# Patient Record
Sex: MALE | Race: OTHER | HISPANIC OR LATINO
[De-identification: names, ages, dates, MRNs, and addresses within clinical notes are randomized per-mention and may not be internally consistent; named-entity substitution may affect disease eponyms.]

---

## 2020-12-09 PROBLEM — Z00.00 ENCOUNTER FOR PREVENTIVE HEALTH EXAMINATION: Status: ACTIVE | Noted: 2020-12-09

## 2020-12-11 ENCOUNTER — APPOINTMENT (OUTPATIENT)
Dept: UROLOGY | Facility: CLINIC | Age: 55
End: 2020-12-11

## 2021-07-22 ENCOUNTER — APPOINTMENT (OUTPATIENT)
Dept: UROLOGY | Facility: CLINIC | Age: 56
End: 2021-07-22
Payer: COMMERCIAL

## 2021-07-22 VITALS
HEART RATE: 65 BPM | DIASTOLIC BLOOD PRESSURE: 101 MMHG | SYSTOLIC BLOOD PRESSURE: 167 MMHG | WEIGHT: 196 LBS | HEIGHT: 69 IN | BODY MASS INDEX: 29.03 KG/M2

## 2021-07-22 DIAGNOSIS — Z86.79 PERSONAL HISTORY OF OTHER DISEASES OF THE CIRCULATORY SYSTEM: ICD-10-CM

## 2021-07-22 DIAGNOSIS — K40.20 BILATERAL INGUINAL HERNIA, W/OUT OBSTRUCTION OR GANGRENE, NOT SPECIFIED AS RECURRENT: ICD-10-CM

## 2021-07-22 DIAGNOSIS — Z82.49 FAMILY HISTORY OF ISCHEMIC HEART DISEASE AND OTHER DISEASES OF THE CIRCULATORY SYSTEM: ICD-10-CM

## 2021-07-22 DIAGNOSIS — F17.290 NICOTINE DEPENDENCE, OTHER TOBACCO PRODUCT, UNCOMPLICATED: ICD-10-CM

## 2021-07-22 PROCEDURE — 99204 OFFICE O/P NEW MOD 45 MIN: CPT

## 2021-07-22 RX ORDER — OLMESARTAN MEDOXOMIL / AMLODIPINE BESYLATE / HYDROCHLOROTHIAZIDE 40; 10; 12.5 MG/1; MG/1; MG/1
40-10-12.5 TABLET, FILM COATED ORAL
Refills: 0 | Status: ACTIVE | COMMUNITY

## 2021-07-22 NOTE — LETTER BODY
[Consult Letter:] : I had the pleasure of evaluating your patient, [unfilled]. [Please see my note below.] : Please see my note below. [Consult Closing:] : Thank you very much for allowing me to participate in the care of this patient.  If you have any questions, please do not hesitate to contact me. [Sincerely,] : Sincerely, [Dear  ___] : Dear  [unfilled], [FreeTextEntry2] : Jonatan Ramirez MD\par [address]\par [place] [address]\par

## 2021-07-22 NOTE — PHYSICAL EXAM
[General Appearance - Well Developed] : well developed [General Appearance - Well Nourished] : well nourished [Normal Appearance] : normal appearance [Well Groomed] : well groomed [General Appearance - In No Acute Distress] : no acute distress [Heart Rate And Rhythm] : Heart rate and rhythm were normal [Edema] : no peripheral edema [Respiration, Rhythm And Depth] : normal respiratory rhythm and effort [Exaggerated Use Of Accessory Muscles For Inspiration] : no accessory muscle use [Auscultation Breath Sounds / Voice Sounds] : lungs were clear to auscultation bilaterally [Abdomen Soft] : soft [Abdomen Tenderness] : non-tender [Costovertebral Angle Tenderness] : no ~M costovertebral angle tenderness [Urethral Meatus] : meatus normal [Penis Abnormality] : normal uncircumcised penis [Testes Tenderness] : no tenderness of the testes [Normal Station and Gait] : the gait and station were normal for the patient's age [] : no rash [Oriented To Time, Place, And Person] : oriented to person, place, and time [Affect] : the affect was normal [Mood] : the mood was normal [Inguinal Lymph Nodes Enlarged Bilaterally] : inguinal [FreeTextEntry1] : Very anxious and embarrassed about the issue especially having to talk about it

## 2021-07-22 NOTE — ASSESSMENT
[FreeTextEntry1] : He appears to have developed erectile dysfunction presumed secondary to vascular disease and when his blood pressure is controlled to the point that he is not going to have a heart attack or stroke there is not enough blood flow to the penis to maintain the erection.\par \par I am going to want blood test to make sure there is nothing hormonal.  I am going to need [place] criteria classification.  He will come back in he tells me that PDE 5 inhibitors do not help we will discuss SSRIs.  I will see if he can worry less about rapid ejaculation if his erections will suffice.  If not we will discuss second line therapy such as injections or implants

## 2021-07-22 NOTE — LETTER HEADER
[FreeTextEntry3] : Frances Mathis M.D.\par Director of Urology\par [facility]\par [address] [place] [address], [address]\[place] [address]

## 2021-07-22 NOTE — HISTORY OF PRESENT ILLNESS
[FreeTextEntry1] : Bebeto is a 55-year-old  male  for a while and in the past he did have a problem with rapid ejaculation.  Using various desensitizing techniques having sex twice the same night they could work alerted his wife was not always ecstatic she was happy enough.  For the last 3 years his erections even when he can hold back on orgasm soften and fall out during sex.  His wife is not very happy and tells him he is getting so embarrassed and distraught that he is avoiding sex and he wants to know what we can do.\par \par He thinks it relates to when he was started on blood pressure medication I explained to him that the blood pressure medication is keeping him alive and will fix the penis 1 way or the other.  When he does get to orgasm his penis is very rigid even without 85 inhibitors and he is tried "over-the-[place]" [place] not know what he is getting and that it really helped that much.\par \par Comorbid conditions include hypertension and his medications are over-the-counter pain medications and vitamins.  He has seen a therapist for addiction and family issues he is now only using cigars and will have no drugs alcohol etc.\par \par He works as a  without exposure to toxic chemicals.\par \par He denies any other urologic issues voiding well without blood positive burning.

## 2021-08-30 ENCOUNTER — APPOINTMENT (OUTPATIENT)
Dept: UROLOGY | Facility: CLINIC | Age: 56
End: 2021-08-30
Payer: COMMERCIAL

## 2021-08-30 VITALS
HEIGHT: 69 IN | DIASTOLIC BLOOD PRESSURE: 97 MMHG | BODY MASS INDEX: 28.88 KG/M2 | SYSTOLIC BLOOD PRESSURE: 145 MMHG | HEART RATE: 87 BPM | WEIGHT: 195 LBS

## 2021-08-30 PROCEDURE — 99213 OFFICE O/P EST LOW 20 MIN: CPT

## 2021-08-30 NOTE — LETTER BODY
[Dear  ___] : Dear  [unfilled], [Consult Letter:] : I had the pleasure of evaluating your patient, [unfilled]. [Please see my note below.] : Please see my note below. [Consult Closing:] : Thank you very much for allowing me to participate in the care of this patient.  If you have any questions, please do not hesitate to contact me. [Sincerely,] : Sincerely, [FreeTextEntry2] : Jonatan Ramirez MD\par [address]\par [place] [address]\par

## 2021-08-30 NOTE — ASSESSMENT
[FreeTextEntry1] : He will obtain [place] criteria and bring it in tomorrow for review.  He understands blood work will take about 1 to 2 weeks to result.  If he brings in [place] criteria tomorrow we will discuss PDE 5 inhibitors

## 2021-08-30 NOTE — LETTER HEADER
[FreeTextEntry3] : Frances Mathis M.D.\par Director of Urology\par [facility]\par [address] [place] [address], [address]\[place] [address]

## 2021-08-30 NOTE — HISTORY OF PRESENT ILLNESS
[FreeTextEntry1] : Bebeto is a 55-year-old male who presented for evaluation of erectile dysfunction.\par \par He obtained hormone blood work however, this morning, and he understands that we will be ready for review for about 1 to 2 weeks.\par \par Additionally he obtain [place] criteria however, did not bring it in for review\par \par Reports having tried both Cialis and Viagra in the past, although he does not know the dose, without much efficacy.  If he could he would not be averse to retrying the Viagra knowing that if his hormones are indeed low its efficacy is compromised\par \par  [Erectile Dysfunction] : Erectile Dysfunction

## 2021-08-31 ENCOUNTER — APPOINTMENT (OUTPATIENT)
Dept: UROLOGY | Facility: CLINIC | Age: 56
End: 2021-08-31
Payer: COMMERCIAL

## 2021-08-31 VITALS — HEIGHT: 69 IN | BODY MASS INDEX: 28.88 KG/M2 | WEIGHT: 195 LBS

## 2021-08-31 PROCEDURE — 99214 OFFICE O/P EST MOD 30 MIN: CPT

## 2021-08-31 NOTE — ASSESSMENT
[FreeTextEntry1] : His [place] criteria classification equals "I"\par His hormones are pending and he has failed PDE 5 inhibitors.  If his hormones are normal then we need to try injections.  If his hormones are low we need to do replacement and then rechallenge with PDE 5 inhibitors.  He will contact us after the hormones are available if he wants to we will have him come in for an office visit if not we will do it as telemedicine.  In the interim we discussed a Doppler study and a self injection program so once we have the hormones we can go straight to further testing if normal or if we correct his hormones PDE 5 inhibitors still fail

## 2021-08-31 NOTE — LETTER HEADER
[FreeTextEntry3] : Frances Mathis M.D.\par Director of Urology\par [facility]\par [address] [place] [address], [address]\[place] [address]

## 2021-08-31 NOTE — LETTER BODY
[Dear  ___] : Dear  [unfilled], [Consult Letter:] : I had the pleasure of evaluating your patient, [unfilled]. [Please see my note below.] : Please see my note below. [Consult Closing:] : Thank you very much for allowing me to participate in the care of this patient.  If you have any questions, please do not hesitate to contact me. [Sincerely,] : Sincerely, [FreeTextEntry2] : Jonatan Ramirez MD\par [address]\par [place] [address]\par

## 2021-08-31 NOTE — HISTORY OF PRESENT ILLNESS
[FreeTextEntry1] : hormones drawn yesterday still pending\par Alfreda brought in but said testing ordered

## 2021-09-09 ENCOUNTER — APPOINTMENT (OUTPATIENT)
Dept: UROLOGY | Facility: CLINIC | Age: 56
End: 2021-09-09
Payer: COMMERCIAL

## 2021-09-09 DIAGNOSIS — N52.9 MALE ERECTILE DYSFUNCTION, UNSPECIFIED: ICD-10-CM

## 2021-09-09 DIAGNOSIS — F52.4 PREMATURE EJACULATION: ICD-10-CM

## 2021-09-09 PROCEDURE — 99214 OFFICE O/P EST MOD 30 MIN: CPT | Mod: 95

## 2021-09-09 NOTE — LETTER BODY
[Dear  ___] : Dear  [unfilled], [Consult Letter:] : I had the pleasure of evaluating your patient, [unfilled]. [Please see my note below.] : Please see my note below. [Consult Closing:] : Thank you very much for allowing me to participate in the care of this patient.  If you have any questions, please do not hesitate to contact me. [Sincerely,] : Sincerely, [FreeTextEntry2] : Jonatan Ramirez MD\par [address]\par [place] [address]\par

## 2021-09-09 NOTE — HISTORY OF PRESENT ILLNESS
[Home] : at home, [unfilled] , at the time of the visit. [Medical Office: ([facility])___] : at the medical office located in  [FreeTextEntry3] : he has received, reviewed and agreed to the telemedicine consent  [FreeTextEntry1] : Bebeto is a 55-year-old male born September 23, 1965.  He was seen on August 31, 2021.  His blood test had been drawn but just yesterday his [place] criteria was brought in but it said he still in the middle of testing and cannot yet be lateralized\par \par I have called his PCP Dr. Jonatan Calvillo who looked through the chart and said that he was depressed and criteria class I but we still needed the blood tests\par \par We elected to meet but today by TeleMed if the hormones are normal we will schedule a Doppler referral as he has failed PDE 5 inhibitors.  If the hormones were low he would have to verify that and if verified normalize him and then rechallenge with PDE 5 inhibitors.  If it still did not work we would then go to hormones.\par \par The bloods from August 30, 2021 are available please see below\par \par \par Contact number: \par Email: [email or web address] \par \par Patient's blood results are in the chart.   [Erectile Dysfunction] : Erectile Dysfunction

## 2021-09-09 NOTE — LETTER HEADER
[FreeTextEntry3] : Frances Mathis M.D.\par Director of Urology\par [facility]\par [address] [place] [address], [address]\[place] [address]

## 2021-09-09 NOTE — ASSESSMENT
[FreeTextEntry1] : His hormones are normal even though they are low normal they are normal and I believe the next step should be a Doppler study I reviewed the issues why he is very afraid of needles and I explained that at least at the test I do the injection it is really a very very tiny needle.  If it works well I can teach his partner to do it though it in truth almost all patients at the end are able to do with him cells

## 2021-10-25 ENCOUNTER — APPOINTMENT (OUTPATIENT)
Dept: UROLOGY | Facility: CLINIC | Age: 56
End: 2021-10-25

## 2022-01-26 RX ORDER — GABAPENTIN 400 MG/1
1 CAPSULE ORAL
Qty: 60 | Refills: 2
Start: 2022-01-26 | End: 2022-04-25

## 2022-02-28 ENCOUNTER — OUTPATIENT (OUTPATIENT)
Dept: OUTPATIENT SERVICES | Facility: HOSPITAL | Age: 57
LOS: 1 days | Discharge: HOME | End: 2022-02-28

## 2022-02-28 DIAGNOSIS — M54.2 CERVICALGIA: ICD-10-CM

## 2022-02-28 DIAGNOSIS — G56.03 CARPAL TUNNEL SYNDROME, BILATERAL UPPER LIMBS: ICD-10-CM

## 2022-02-28 DIAGNOSIS — M48.02 SPINAL STENOSIS, CERVICAL REGION: ICD-10-CM

## 2022-07-20 ENCOUNTER — APPOINTMENT (OUTPATIENT)
Dept: UROLOGY | Facility: CLINIC | Age: 57
End: 2022-07-20

## 2025-01-17 ENCOUNTER — APPOINTMENT (OUTPATIENT)
Dept: NEUROLOGY | Facility: CLINIC | Age: 60
End: 2025-01-17
Payer: COMMERCIAL

## 2025-01-17 VITALS
HEART RATE: 66 BPM | SYSTOLIC BLOOD PRESSURE: 157 MMHG | WEIGHT: 200 LBS | BODY MASS INDEX: 29.62 KG/M2 | HEIGHT: 69 IN | DIASTOLIC BLOOD PRESSURE: 95 MMHG

## 2025-01-17 DIAGNOSIS — H49.21 SIXTH [ABDUCENT] NERVE PALSY, RIGHT EYE: ICD-10-CM

## 2025-01-17 PROCEDURE — 99204 OFFICE O/P NEW MOD 45 MIN: CPT

## 2025-02-03 ENCOUNTER — APPOINTMENT (OUTPATIENT)
Dept: NEUROLOGY | Facility: CLINIC | Age: 60
End: 2025-02-03
Payer: COMMERCIAL

## 2025-02-03 PROCEDURE — 99214 OFFICE O/P EST MOD 30 MIN: CPT
